# Patient Record
Sex: FEMALE | Race: OTHER | NOT HISPANIC OR LATINO | URBAN - METROPOLITAN AREA
[De-identification: names, ages, dates, MRNs, and addresses within clinical notes are randomized per-mention and may not be internally consistent; named-entity substitution may affect disease eponyms.]

---

## 2018-10-14 ENCOUNTER — INPATIENT (INPATIENT)
Facility: HOSPITAL | Age: 33
LOS: 2 days | Discharge: HOME | End: 2018-10-17
Attending: OBSTETRICS & GYNECOLOGY | Admitting: OBSTETRICS & GYNECOLOGY
Payer: COMMERCIAL

## 2018-10-14 VITALS — WEIGHT: 171.08 LBS | TEMPERATURE: 98 F | HEIGHT: 64 IN

## 2018-10-14 DIAGNOSIS — Z3A.00 WEEKS OF GESTATION OF PREGNANCY NOT SPECIFIED: ICD-10-CM

## 2018-10-14 DIAGNOSIS — O26.899 OTHER SPECIFIED PREGNANCY RELATED CONDITIONS, UNSPECIFIED TRIMESTER: ICD-10-CM

## 2018-10-14 LAB
BASOPHILS NFR BLD AUTO: 0.1 % — SIGNIFICANT CHANGE UP (ref 0–2)
BLD GP AB SCN SERPL QL: NEGATIVE — SIGNIFICANT CHANGE UP
EOSINOPHIL NFR BLD AUTO: 0.3 % — SIGNIFICANT CHANGE UP (ref 0–6)
HCT VFR BLD CALC: 33.9 % — LOW (ref 34.5–45)
HGB BLD-MCNC: 11.2 G/DL — LOW (ref 11.5–15.5)
LYMPHOCYTES # BLD AUTO: 9.9 % — LOW (ref 13–44)
MCHC RBC-ENTMCNC: 29.6 PG — SIGNIFICANT CHANGE UP (ref 27–34)
MCHC RBC-ENTMCNC: 33 G/DL — SIGNIFICANT CHANGE UP (ref 32–36)
MCV RBC AUTO: 89.4 FL — SIGNIFICANT CHANGE UP (ref 80–100)
MONOCYTES NFR BLD AUTO: 9.3 % — SIGNIFICANT CHANGE UP (ref 2–14)
NEUTROPHILS NFR BLD AUTO: 80.4 % — HIGH (ref 43–77)
PLATELET # BLD AUTO: 271 K/UL — SIGNIFICANT CHANGE UP (ref 150–400)
RBC # BLD: 3.79 M/UL — LOW (ref 3.8–5.2)
RBC # FLD: 13.1 % — SIGNIFICANT CHANGE UP (ref 10.3–16.9)
RH IG SCN BLD-IMP: POSITIVE — SIGNIFICANT CHANGE UP
RH IG SCN BLD-IMP: POSITIVE — SIGNIFICANT CHANGE UP
WBC # BLD: 17.7 K/UL — HIGH (ref 3.8–10.5)
WBC # FLD AUTO: 17.7 K/UL — HIGH (ref 3.8–10.5)

## 2018-10-14 RX ORDER — FENTANYL/BUPIVACAINE/NS/PF 2MCG/ML-.1
250 PLASTIC BAG, INJECTION (ML) INJECTION
Qty: 0 | Refills: 0 | Status: DISCONTINUED | OUTPATIENT
Start: 2018-10-14 | End: 2018-10-15

## 2018-10-14 RX ORDER — OXYTOCIN 10 UNIT/ML
333.33 VIAL (ML) INJECTION
Qty: 20 | Refills: 0 | Status: DISCONTINUED | OUTPATIENT
Start: 2018-10-14 | End: 2018-10-15

## 2018-10-14 RX ORDER — SODIUM CHLORIDE 9 MG/ML
1000 INJECTION, SOLUTION INTRAVENOUS ONCE
Qty: 0 | Refills: 0 | Status: DISCONTINUED | OUTPATIENT
Start: 2018-10-14 | End: 2018-10-15

## 2018-10-14 RX ORDER — CITRIC ACID/SODIUM CITRATE 300-500 MG
15 SOLUTION, ORAL ORAL ONCE
Qty: 0 | Refills: 0 | Status: DISCONTINUED | OUTPATIENT
Start: 2018-10-14 | End: 2018-10-15

## 2018-10-14 RX ORDER — SODIUM CHLORIDE 9 MG/ML
1000 INJECTION, SOLUTION INTRAVENOUS
Qty: 0 | Refills: 0 | Status: DISCONTINUED | OUTPATIENT
Start: 2018-10-14 | End: 2018-10-15

## 2018-10-15 LAB — T PALLIDUM AB TITR SER: NEGATIVE — SIGNIFICANT CHANGE UP

## 2018-10-15 RX ORDER — OXYTOCIN 10 UNIT/ML
41.67 VIAL (ML) INJECTION
Qty: 20 | Refills: 0 | Status: DISCONTINUED | OUTPATIENT
Start: 2018-10-15 | End: 2018-10-17

## 2018-10-15 RX ORDER — DOCUSATE SODIUM 100 MG
100 CAPSULE ORAL
Qty: 0 | Refills: 0 | Status: DISCONTINUED | OUTPATIENT
Start: 2018-10-15 | End: 2018-10-17

## 2018-10-15 RX ORDER — MAGNESIUM HYDROXIDE 400 MG/1
30 TABLET, CHEWABLE ORAL
Qty: 0 | Refills: 0 | Status: DISCONTINUED | OUTPATIENT
Start: 2018-10-15 | End: 2018-10-17

## 2018-10-15 RX ORDER — AMPICILLIN TRIHYDRATE 250 MG
2 CAPSULE ORAL EVERY 6 HOURS
Qty: 0 | Refills: 0 | Status: DISCONTINUED | OUTPATIENT
Start: 2018-10-15 | End: 2018-10-16

## 2018-10-15 RX ORDER — GENTAMICIN SULFATE 40 MG/ML
390 VIAL (ML) INJECTION ONCE
Qty: 0 | Refills: 0 | Status: COMPLETED | OUTPATIENT
Start: 2018-10-15 | End: 2018-10-15

## 2018-10-15 RX ORDER — PRAMOXINE HYDROCHLORIDE 150 MG/15G
1 AEROSOL, FOAM RECTAL EVERY 4 HOURS
Qty: 0 | Refills: 0 | Status: DISCONTINUED | OUTPATIENT
Start: 2018-10-15 | End: 2018-10-17

## 2018-10-15 RX ORDER — CITRIC ACID/SODIUM CITRATE 300-500 MG
30 SOLUTION, ORAL ORAL ONCE
Qty: 0 | Refills: 0 | Status: DISCONTINUED | OUTPATIENT
Start: 2018-10-15 | End: 2018-10-15

## 2018-10-15 RX ORDER — ACETAMINOPHEN 500 MG
650 TABLET ORAL EVERY 6 HOURS
Qty: 0 | Refills: 0 | Status: DISCONTINUED | OUTPATIENT
Start: 2018-10-15 | End: 2018-10-17

## 2018-10-15 RX ORDER — GLYCERIN ADULT
1 SUPPOSITORY, RECTAL RECTAL AT BEDTIME
Qty: 0 | Refills: 0 | Status: DISCONTINUED | OUTPATIENT
Start: 2018-10-15 | End: 2018-10-17

## 2018-10-15 RX ORDER — ACETAMINOPHEN 500 MG
1000 TABLET ORAL ONCE
Qty: 0 | Refills: 0 | Status: COMPLETED | OUTPATIENT
Start: 2018-10-15 | End: 2018-10-15

## 2018-10-15 RX ORDER — OXYCODONE AND ACETAMINOPHEN 5; 325 MG/1; MG/1
2 TABLET ORAL EVERY 6 HOURS
Qty: 0 | Refills: 0 | Status: DISCONTINUED | OUTPATIENT
Start: 2018-10-15 | End: 2018-10-17

## 2018-10-15 RX ORDER — SIMETHICONE 80 MG/1
80 TABLET, CHEWABLE ORAL EVERY 6 HOURS
Qty: 0 | Refills: 0 | Status: DISCONTINUED | OUTPATIENT
Start: 2018-10-15 | End: 2018-10-17

## 2018-10-15 RX ORDER — TETANUS TOXOID, REDUCED DIPHTHERIA TOXOID AND ACELLULAR PERTUSSIS VACCINE, ADSORBED 5; 2.5; 8; 8; 2.5 [IU]/.5ML; [IU]/.5ML; UG/.5ML; UG/.5ML; UG/.5ML
0.5 SUSPENSION INTRAMUSCULAR ONCE
Qty: 0 | Refills: 0 | Status: DISCONTINUED | OUTPATIENT
Start: 2018-10-15 | End: 2018-10-17

## 2018-10-15 RX ORDER — SODIUM CHLORIDE 9 MG/ML
3 INJECTION INTRAMUSCULAR; INTRAVENOUS; SUBCUTANEOUS EVERY 8 HOURS
Qty: 0 | Refills: 0 | Status: DISCONTINUED | OUTPATIENT
Start: 2018-10-15 | End: 2018-10-17

## 2018-10-15 RX ORDER — AMPICILLIN TRIHYDRATE 250 MG
2 CAPSULE ORAL ONCE
Qty: 0 | Refills: 0 | Status: COMPLETED | OUTPATIENT
Start: 2018-10-15 | End: 2018-10-15

## 2018-10-15 RX ORDER — AER TRAVELER 0.5 G/1
1 SOLUTION RECTAL; TOPICAL EVERY 4 HOURS
Qty: 0 | Refills: 0 | Status: DISCONTINUED | OUTPATIENT
Start: 2018-10-15 | End: 2018-10-17

## 2018-10-15 RX ORDER — AMPICILLIN TRIHYDRATE 250 MG
CAPSULE ORAL
Qty: 0 | Refills: 0 | Status: DISCONTINUED | OUTPATIENT
Start: 2018-10-15 | End: 2018-10-16

## 2018-10-15 RX ORDER — CEFAZOLIN SODIUM 1 G
2000 VIAL (EA) INJECTION ONCE
Qty: 0 | Refills: 0 | Status: DISCONTINUED | OUTPATIENT
Start: 2018-10-15 | End: 2018-10-15

## 2018-10-15 RX ORDER — HYDROCORTISONE 1 %
1 OINTMENT (GRAM) TOPICAL EVERY 4 HOURS
Qty: 0 | Refills: 0 | Status: DISCONTINUED | OUTPATIENT
Start: 2018-10-15 | End: 2018-10-17

## 2018-10-15 RX ORDER — AZITHROMYCIN 500 MG/1
500 TABLET, FILM COATED ORAL ONCE
Qty: 0 | Refills: 0 | Status: DISCONTINUED | OUTPATIENT
Start: 2018-10-15 | End: 2018-10-15

## 2018-10-15 RX ORDER — DIPHENHYDRAMINE HCL 50 MG
25 CAPSULE ORAL EVERY 6 HOURS
Qty: 0 | Refills: 0 | Status: DISCONTINUED | OUTPATIENT
Start: 2018-10-15 | End: 2018-10-17

## 2018-10-15 RX ORDER — OXYCODONE AND ACETAMINOPHEN 5; 325 MG/1; MG/1
1 TABLET ORAL
Qty: 0 | Refills: 0 | Status: DISCONTINUED | OUTPATIENT
Start: 2018-10-15 | End: 2018-10-17

## 2018-10-15 RX ORDER — IBUPROFEN 200 MG
600 TABLET ORAL EVERY 6 HOURS
Qty: 0 | Refills: 0 | Status: DISCONTINUED | OUTPATIENT
Start: 2018-10-15 | End: 2018-10-17

## 2018-10-15 RX ORDER — DIBUCAINE 1 %
1 OINTMENT (GRAM) RECTAL EVERY 4 HOURS
Qty: 0 | Refills: 0 | Status: DISCONTINUED | OUTPATIENT
Start: 2018-10-15 | End: 2018-10-17

## 2018-10-15 RX ORDER — LANOLIN
1 OINTMENT (GRAM) TOPICAL EVERY 6 HOURS
Qty: 0 | Refills: 0 | Status: DISCONTINUED | OUTPATIENT
Start: 2018-10-15 | End: 2018-10-17

## 2018-10-15 RX ADMIN — Medication 216 GRAM(S): at 19:53

## 2018-10-15 RX ADMIN — Medication 1 CAPSULE(S): at 13:00

## 2018-10-15 RX ADMIN — OXYCODONE AND ACETAMINOPHEN 1 TABLET(S): 5; 325 TABLET ORAL at 05:00

## 2018-10-15 RX ADMIN — Medication 1 APPLICATION(S): at 12:01

## 2018-10-15 RX ADMIN — SODIUM CHLORIDE 3 MILLILITER(S): 9 INJECTION INTRAMUSCULAR; INTRAVENOUS; SUBCUTANEOUS at 14:01

## 2018-10-15 RX ADMIN — Medication 600 MILLIGRAM(S): at 06:35

## 2018-10-15 RX ADMIN — Medication 100 MILLIGRAM(S): at 19:10

## 2018-10-15 RX ADMIN — OXYCODONE AND ACETAMINOPHEN 1 TABLET(S): 5; 325 TABLET ORAL at 04:40

## 2018-10-15 RX ADMIN — Medication 600 MILLIGRAM(S): at 07:05

## 2018-10-15 RX ADMIN — Medication 216 GRAM(S): at 14:01

## 2018-10-15 RX ADMIN — SODIUM CHLORIDE 3 MILLILITER(S): 9 INJECTION INTRAMUSCULAR; INTRAVENOUS; SUBCUTANEOUS at 06:35

## 2018-10-15 RX ADMIN — Medication 100 MILLIGRAM(S): at 12:02

## 2018-10-15 RX ADMIN — Medication 100 MILLIGRAM(S): at 04:05

## 2018-10-15 RX ADMIN — Medication 1 CAPSULE(S): at 12:01

## 2018-10-15 RX ADMIN — Medication 200 MILLIGRAM(S): at 02:42

## 2018-10-15 RX ADMIN — Medication 600 MILLIGRAM(S): at 12:01

## 2018-10-15 RX ADMIN — Medication 100 MILLIGRAM(S): at 12:01

## 2018-10-15 RX ADMIN — OXYCODONE AND ACETAMINOPHEN 1 TABLET(S): 5; 325 TABLET ORAL at 19:13

## 2018-10-15 RX ADMIN — OXYCODONE AND ACETAMINOPHEN 1 TABLET(S): 5; 325 TABLET ORAL at 20:09

## 2018-10-15 RX ADMIN — Medication 600 MILLIGRAM(S): at 20:09

## 2018-10-15 RX ADMIN — Medication 1 TABLET(S): at 12:01

## 2018-10-15 RX ADMIN — Medication 125 MILLIUNIT(S)/MIN: at 02:51

## 2018-10-15 RX ADMIN — Medication 600 MILLIGRAM(S): at 19:04

## 2018-10-15 RX ADMIN — Medication 400 MILLIGRAM(S): at 02:23

## 2018-10-15 RX ADMIN — Medication 600 MILLIGRAM(S): at 13:00

## 2018-10-15 RX ADMIN — Medication 216 GRAM(S): at 02:02

## 2018-10-15 RX ADMIN — Medication 216 GRAM(S): at 08:12

## 2018-10-16 RX ORDER — INFLUENZA VIRUS VACCINE 15; 15; 15; 15 UG/.5ML; UG/.5ML; UG/.5ML; UG/.5ML
0.5 SUSPENSION INTRAMUSCULAR ONCE
Qty: 0 | Refills: 0 | Status: COMPLETED | OUTPATIENT
Start: 2018-10-16 | End: 2018-10-16

## 2018-10-16 RX ADMIN — AER TRAVELER 1 APPLICATION(S): 0.5 SOLUTION RECTAL; TOPICAL at 18:05

## 2018-10-16 RX ADMIN — Medication 1 CAPSULE(S): at 18:47

## 2018-10-16 RX ADMIN — AER TRAVELER 1 APPLICATION(S): 0.5 SOLUTION RECTAL; TOPICAL at 10:29

## 2018-10-16 RX ADMIN — Medication 600 MILLIGRAM(S): at 13:27

## 2018-10-16 RX ADMIN — Medication 600 MILLIGRAM(S): at 01:15

## 2018-10-16 RX ADMIN — Medication 600 MILLIGRAM(S): at 08:00

## 2018-10-16 RX ADMIN — Medication 600 MILLIGRAM(S): at 12:27

## 2018-10-16 RX ADMIN — Medication 600 MILLIGRAM(S): at 23:44

## 2018-10-16 RX ADMIN — Medication 600 MILLIGRAM(S): at 18:04

## 2018-10-16 RX ADMIN — Medication 600 MILLIGRAM(S): at 00:45

## 2018-10-16 RX ADMIN — OXYCODONE AND ACETAMINOPHEN 1 TABLET(S): 5; 325 TABLET ORAL at 06:04

## 2018-10-16 RX ADMIN — SODIUM CHLORIDE 3 MILLILITER(S): 9 INJECTION INTRAMUSCULAR; INTRAVENOUS; SUBCUTANEOUS at 13:28

## 2018-10-16 RX ADMIN — Medication 600 MILLIGRAM(S): at 19:00

## 2018-10-16 RX ADMIN — INFLUENZA VIRUS VACCINE 0.5 MILLILITER(S): 15; 15; 15; 15 SUSPENSION INTRAMUSCULAR at 12:43

## 2018-10-16 RX ADMIN — Medication 1 CAPSULE(S): at 19:00

## 2018-10-16 RX ADMIN — OXYCODONE AND ACETAMINOPHEN 1 TABLET(S): 5; 325 TABLET ORAL at 16:50

## 2018-10-16 RX ADMIN — Medication 600 MILLIGRAM(S): at 07:10

## 2018-10-16 RX ADMIN — OXYCODONE AND ACETAMINOPHEN 1 TABLET(S): 5; 325 TABLET ORAL at 06:45

## 2018-10-16 RX ADMIN — AER TRAVELER 1 APPLICATION(S): 0.5 SOLUTION RECTAL; TOPICAL at 13:29

## 2018-10-16 RX ADMIN — OXYCODONE AND ACETAMINOPHEN 1 TABLET(S): 5; 325 TABLET ORAL at 16:04

## 2018-10-16 RX ADMIN — Medication 1 TABLET(S): at 12:27

## 2018-10-16 NOTE — PROGRESS NOTE ADULT - SUBJECTIVE AND OBJECTIVE BOX
Patient evaluated at bedside.     She reports pain is well controlled with percocet and motrin. She is taking fioricet for spinal HA. Reports medications helps HA, reprots HA is improved currently.     She has been ambulating without assistance, voiding spontaneously, and is breastfeeding.    She denies chills, dizziness, chest pain, palpitations, shortness of breath, n/v, heavy vaginal bleeding or perineal discomfort.    Physical Exam:  Vital Signs Last 24 Hrs  T(C): 36.5 (16 Oct 2018 06:00), Max: 36.7 (15 Oct 2018 10:11)  T(F): 97.7 (16 Oct 2018 06:00), Max: 98.1 (15 Oct 2018 14:00)  HR: 84 (16 Oct 2018 06:00) (72 - 87)  BP: 99/66 (16 Oct 2018 06:00) (94/61 - 102/64)  BP(mean): --  RR: 18 (16 Oct 2018 06:00) (17 - 18)  SpO2: 97% (16 Oct 2018 06:00) (97% - 99%)    GA: NAD, A+0 x 3  Abd: + BS, soft, nontender, nondistended, no rebound or guarding, uterus firm at midline  : lochia WNL  Extremities: no edema or calf tenderness                          11.2   17.7  )-----------( 271      ( 14 Oct 2018 16:59 )             33.9

## 2018-10-16 NOTE — PROGRESS NOTE ADULT - ASSESSMENT
A/P yo 33y  s/p , PP#1 , c/b suspected triple I, now stable  1. Suspected triple I  -s/p A/G  -currently resolved  2. Spinal HA  -HA improved this morning on fioricet  -possible blood patch if HA returns  3. Pain: OPM  4. GI: Reg  5. :  Voiding  6. DVT prophylaxis: SCDs, ambulation  7. Dispo: PP#2

## 2018-10-16 NOTE — LACTATION INITIAL EVALUATION - NS LACT CON REASON FOR REQ
Baby ~30 hours, asleep in WBN at this time. Mother instructed to supplement overnight due to 8% weight loss in <24 hours. Mother reports baby is latching well, voiding and stooling. Reinforced triple feeding plan of care until weight loss stablizes or as per peds. Plan was explained in detail; pt is to offer the breast on demand and supplement with EBM. Breast pump at bedside. Encouraged to call for assistance as needed./general questions without assessment/multiparous mom

## 2018-10-16 NOTE — PROGRESS NOTE ADULT - SUBJECTIVE AND OBJECTIVE BOX
Pt is day 2 s/p  w PDPH after wet tap during epidural placement on 10/14/18. Pt with complaints of positional headache at this time worse with working. Pt offered epidural blood patch but is refusing at this time. She would like to proceed with conservative therapy at this time. Will continue to follow.

## 2018-10-17 VITALS
RESPIRATION RATE: 18 BRPM | DIASTOLIC BLOOD PRESSURE: 65 MMHG | HEART RATE: 82 BPM | OXYGEN SATURATION: 98 % | SYSTOLIC BLOOD PRESSURE: 108 MMHG | TEMPERATURE: 98 F

## 2018-10-17 LAB — SURGICAL PATHOLOGY STUDY: SIGNIFICANT CHANGE UP

## 2018-10-17 PROCEDURE — 85025 COMPLETE CBC W/AUTO DIFF WBC: CPT

## 2018-10-17 PROCEDURE — 90686 IIV4 VACC NO PRSV 0.5 ML IM: CPT

## 2018-10-17 PROCEDURE — 99214 OFFICE O/P EST MOD 30 MIN: CPT

## 2018-10-17 PROCEDURE — 86780 TREPONEMA PALLIDUM: CPT

## 2018-10-17 PROCEDURE — 86901 BLOOD TYPING SEROLOGIC RH(D): CPT

## 2018-10-17 PROCEDURE — 86900 BLOOD TYPING SEROLOGIC ABO: CPT

## 2018-10-17 PROCEDURE — 86850 RBC ANTIBODY SCREEN: CPT

## 2018-10-17 PROCEDURE — 88307 TISSUE EXAM BY PATHOLOGIST: CPT

## 2018-10-17 RX ADMIN — Medication 1 CAPSULE(S): at 16:00

## 2018-10-17 RX ADMIN — Medication 1 CAPSULE(S): at 09:30

## 2018-10-17 RX ADMIN — Medication 600 MILLIGRAM(S): at 00:44

## 2018-10-17 RX ADMIN — Medication 600 MILLIGRAM(S): at 07:57

## 2018-10-17 RX ADMIN — Medication 1 CAPSULE(S): at 15:13

## 2018-10-17 RX ADMIN — SODIUM CHLORIDE 3 MILLILITER(S): 9 INJECTION INTRAMUSCULAR; INTRAVENOUS; SUBCUTANEOUS at 12:31

## 2018-10-17 RX ADMIN — Medication 100 MILLIGRAM(S): at 12:32

## 2018-10-17 RX ADMIN — Medication 1 TABLET(S): at 12:31

## 2018-10-17 RX ADMIN — Medication 600 MILLIGRAM(S): at 08:48

## 2018-10-17 RX ADMIN — Medication 1 CAPSULE(S): at 08:45

## 2018-10-17 NOTE — PROGRESS NOTE ADULT - SUBJECTIVE AND OBJECTIVE BOX
Patient evaluated at bedside.   She reports pain is well controlled.    She has been ambulating without assistance, voiding spontaneously, and is breastfeeding.    She denies HA, dizziness, chest pain, palpitations, shortness of breathe, n/v, heavy vaginal bleeding or perineal discomfort.    Physical Exam:  Vital Signs Last 24 Hrs  T(C): 36.8 (17 Oct 2018 06:00), Max: 36.8 (17 Oct 2018 06:00)  T(F): 98.3 (17 Oct 2018 06:00), Max: 98.3 (17 Oct 2018 06:00)  HR: 87 (17 Oct 2018 06:00) (78 - 87)  BP: 95/62 (17 Oct 2018 06:00) (95/62 - 114/75)  RR: 18 (17 Oct 2018 06:00) (18 - 18)  SpO2: 97% (17 Oct 2018 06:00) (97% - 99%)    GA: NAD, A+0 x 3  Abd: + BS, soft, nontender, nondistended, no rebound or guarding, uterus firm at midline  : lochia WNL  Extremities: no swelling or calf tenderness        MEDICATIONS  (STANDING):  diphtheria/tetanus/pertussis (acellular) Vaccine (ADAcel) 0.5 milliLiter(s) IntraMuscular once  ibuprofen  Tablet. 600 milliGRAM(s) Oral every 6 hours  oxytocin Infusion 41.667 milliUNIT(s)/Min (125 mL/Hr) IV Continuous <Continuous>  prenatal multivitamin 1 Tablet(s) Oral daily  sodium chloride 0.9% lock flush 3 milliLiter(s) IV Push every 8 hours  witch hazel Pads 1 Application(s) Topical every 4 hours    MEDICATIONS  (PRN):  acetaminophen   Tablet .. 650 milliGRAM(s) Oral every 6 hours PRN Temp greater or equal to 38.5C (101.3F), Mild Pain (1 - 3)  acetaminophen 300 mG/butalbital 50 mG/ caffeine 40 mG 1 Capsule(s) Oral every 6 hours PRN headache  dibucaine 1% Ointment 1 Application(s) Topical every 4 hours PRN Moderate Perineal Discomfort  diphenhydrAMINE 25 milliGRAM(s) Oral every 6 hours PRN Itching  docusate sodium 100 milliGRAM(s) Oral two times a day PRN Stool Softening  glycerin Suppository - Adult 1 Suppository(s) Rectal at bedtime PRN Constipation  hydrocortisone 1% Cream 1 Application(s) Topical every 4 hours PRN Severe Perineal Pain  lanolin Ointment 1 Application(s) Topical every 6 hours PRN Sore Nipples  magnesium hydroxide Suspension 30 milliLiter(s) Oral two times a day PRN Constipation  oxyCODONE    5 mG/acetaminophen 325 mG 1 Tablet(s) Oral every 3 hours PRN Moderate Pain (4 - 6)  oxyCODONE    5 mG/acetaminophen 325 mG 2 Tablet(s) Oral every 6 hours PRN Severe Pain (7 - 10)  pramoxine 1%/zinc 5% Cream 1 Application(s) Topical every 4 hours PRN Mild Perineal Pain  simethicone 80 milliGRAM(s) Chew every 6 hours PRN Gas

## 2018-10-17 NOTE — DISCHARGE NOTE OB - PATIENT PORTAL LINK FT
You can access the [facility] Patient Portal, offered by [facility], by registering with the following website: [email or web address]

## 2018-10-17 NOTE — DISCHARGE NOTE OB - CARE PLAN
Principal Discharge DX:	Postpartum state  Goal:	postpartum care  Assessment and plan of treatment:	33y female s/p vaginal delivery, postpartum day 2, meeting all postpartum milestones. Pelvic rest until cleared. Follow-up with obstetrician in 6 weeks.  Secondary Diagnosis:	Spinal headache complicating labor and delivery, postpartum condition  Goal:	resolution of symptoms  Assessment and plan of treatment:	continue fioricet as needed for severe headache. Do not take more than 2 times daily AND/OR for more than 3 days for tendency to cause rebound headache.

## 2018-10-17 NOTE — DISCHARGE NOTE OB - MEDICATION SUMMARY - MEDICATIONS TO TAKE
I will START or STAY ON the medications listed below when I get home from the hospital:    butalbital/acetaminophen/caffeine 50 mg-300 mg-40 mg oral capsule  -- 1 cap(s) by mouth every 6 hours, As needed, headache  -- Indication: For as needed for severe headache    Prena1 oral capsule  -- 1 cap(s) by mouth once a day  -- Indication: For Postpartum state

## 2018-10-17 NOTE — PROGRESS NOTE ADULT - ASSESSMENT
A/P yo 33y  s/p , PP#2 , c/b suspected triple I, now stable  1. Suspected triple I  -s/p A/G  -currently resolved  2. Spinal HA  -HA improved this morning on Fioricet (no complaints of HA this morning.  (possible blood patch if HA returns).    1. Pain: OPM  2. GI: Reg  3. :  Voiding  4. DVT prophylaxis: SCDs, ambulation  5. Dispo: PP#2

## 2018-10-17 NOTE — DISCHARGE NOTE OB - CARE PROVIDER_API CALL
Malka Rodas), Obstetrics and Gynecology  [facility] [place] [address]  Phone: [phone]  Fax: [phone]

## 2018-10-17 NOTE — DISCHARGE NOTE OB - HOSPITAL COURSE
s/p  . course c/b spinal headache - headache resolved with fioricet. cleared by anesthesia for discharge.

## 2018-10-17 NOTE — DISCHARGE NOTE OB - PLAN OF CARE
postpartum care 33y female s/p vaginal delivery, postpartum day 2, meeting all postpartum milestones. Pelvic rest until cleared. Follow-up with obstetrician in 6 weeks. resolution of symptoms continue fioricet as needed for severe headache. Do not take more than 2 times daily AND/OR for more than 3 days for tendency to cause rebound headache.

## 2018-10-19 ENCOUNTER — EMERGENCY (EMERGENCY)
Facility: HOSPITAL | Age: 33
LOS: 1 days | Discharge: HOME | End: 2018-10-19
Attending: EMERGENCY MEDICINE | Admitting: EMERGENCY MEDICINE
Payer: COMMERCIAL

## 2018-10-19 VITALS
OXYGEN SATURATION: 98 % | SYSTOLIC BLOOD PRESSURE: 119 MMHG | TEMPERATURE: 98 F | RESPIRATION RATE: 20 BRPM | HEART RATE: 71 BPM | DIASTOLIC BLOOD PRESSURE: 72 MMHG

## 2018-10-19 DIAGNOSIS — O99.89 OTHER SPECIFIED DISEASES AND CONDITIONS COMPLICATING PREGNANCY, CHILDBIRTH AND THE PUERPERIUM: ICD-10-CM

## 2018-10-19 DIAGNOSIS — Z79.899 OTHER LONG TERM (CURRENT) DRUG THERAPY: ICD-10-CM

## 2018-10-19 DIAGNOSIS — R51 HEADACHE: ICD-10-CM

## 2018-10-19 PROCEDURE — 99284 EMERGENCY DEPT VISIT MOD MDM: CPT

## 2018-10-19 RX ORDER — KETOROLAC TROMETHAMINE 30 MG/ML
30 SYRINGE (ML) INJECTION ONCE
Qty: 0 | Refills: 0 | Status: DISCONTINUED | OUTPATIENT
Start: 2018-10-19 | End: 2018-10-19

## 2018-10-19 RX ORDER — METOCLOPRAMIDE HCL 10 MG
10 TABLET ORAL ONCE
Qty: 0 | Refills: 0 | Status: COMPLETED | OUTPATIENT
Start: 2018-10-19 | End: 2018-10-19

## 2018-10-19 RX ORDER — HYDROMORPHONE HYDROCHLORIDE 2 MG/ML
0.5 INJECTION INTRAMUSCULAR; INTRAVENOUS; SUBCUTANEOUS ONCE
Qty: 0 | Refills: 0 | Status: DISCONTINUED | OUTPATIENT
Start: 2018-10-19 | End: 2018-10-19

## 2018-10-19 RX ORDER — SODIUM CHLORIDE 9 MG/ML
500 INJECTION INTRAMUSCULAR; INTRAVENOUS; SUBCUTANEOUS ONCE
Qty: 0 | Refills: 0 | Status: COMPLETED | OUTPATIENT
Start: 2018-10-19 | End: 2018-10-19

## 2018-10-19 RX ADMIN — HYDROMORPHONE HYDROCHLORIDE 0.5 MILLIGRAM(S): 2 INJECTION INTRAMUSCULAR; INTRAVENOUS; SUBCUTANEOUS at 22:57

## 2018-10-19 RX ADMIN — Medication 30 MILLIGRAM(S): at 22:25

## 2018-10-19 RX ADMIN — SODIUM CHLORIDE 1000 MILLILITER(S): 9 INJECTION INTRAMUSCULAR; INTRAVENOUS; SUBCUTANEOUS at 22:24

## 2018-10-19 RX ADMIN — Medication 104 MILLIGRAM(S): at 22:25

## 2018-10-19 NOTE — ED PROVIDER NOTE - PROGRESS NOTE DETAILS
blood patch done by anesthesiologist. Pt feels much better. will observed for a few hours and d/c home if conditions the same.

## 2018-10-19 NOTE — ED ADULT NURSE REASSESSMENT NOTE - NS ED NURSE REASSESS COMMENT FT1
s/p epidural patch done, pt lying flat on bed for 2 hours, A&Ox3, NAD, denies pain at the moment, RN will continue monitor the pt.

## 2018-10-19 NOTE — ED PROVIDER NOTE - MEDICAL DECISION MAKING DETAILS
34 yo female, 5 days s/p NVD, in the ER due to persistent spinal HA. Pt was recommended to have blood patch done. Afebrile, VSS. Anesthesiologist called and case discussed. pending procedure.

## 2018-10-19 NOTE — ED PROVIDER NOTE - ATTENDING CONTRIBUTION TO CARE
33 yof pw HA after spinal epidural.  no fc, no neck pain.  no paresthesia or weakness to extremities.    agree w/ PA, nad, ao x 3, clear speech, will d/w anesthesia regarding blood patch, reassess

## 2018-10-19 NOTE — ED ADULT NURSE NOTE - NSIMPLEMENTINTERV_GEN_ALL_ED
Implemented All Universal Safety Interventions:  [place] to call system. Call bell, personal items and telephone within reach. Instruct patient to call for assistance. Room bathroom lighting operational. Non-slip footwear when patient is off stretcher. Physically safe environment: no spills, clutter or unnecessary equipment. Stretcher in lowest position, wheels locked, appropriate side rails in place.

## 2018-10-19 NOTE — ED ADULT TRIAGE NOTE - ARRIVAL INFO ADDITIONAL COMMENTS
Pt walked into ED with c/o of lower back pain that radiates to the head with a headache and nausea. Onset was tuesday. She states she had vaginal delivery on momday. She states there was an epidural leakage and was offered blood patch but refused. She denies V+D, fever, chills, visual changes, numbness, tingling, unilateral weakness, CP, increased vaginal bleeding. She was given fiorocet at the hospital which did help. She last took ibuprofen with no help. Denies HTN, or medical problems.

## 2018-10-19 NOTE — ED ADULT NURSE NOTE - OBJECTIVE STATEMENT
s/p vaginal delivery Monday of this week had epidural on the delivery, as per pt had s/p vaginal delivery Monday of this week had epidural on the delivery, as per pt had epidural leakage and advised to have blood patch and she refused, since Tuesday of this week been having headache from back to head more severe today, last motrin @ 7pm today.

## 2018-10-19 NOTE — ED PROVIDER NOTE - OBJECTIVE STATEMENT
32 yo female s/p NVD on 10/15, in the Er due to persistent HA. Pt reports she had HA right after her delivery, was discharged home with Fioricet and Motrin for it , but has no relief. Pt was  told that she needs a blood patch in order to treat her spinal HA.   Denies fever, chills. HA associated with dizziness, nausea.

## 2018-10-20 VITALS
HEART RATE: 60 BPM | SYSTOLIC BLOOD PRESSURE: 109 MMHG | RESPIRATION RATE: 18 BRPM | DIASTOLIC BLOOD PRESSURE: 62 MMHG | OXYGEN SATURATION: 98 % | TEMPERATURE: 98 F

## 2018-10-20 LAB
ANION GAP SERPL CALC-SCNC: 17 MMOL/L — SIGNIFICANT CHANGE UP (ref 5–17)
BASOPHILS NFR BLD AUTO: 0.2 % — SIGNIFICANT CHANGE UP (ref 0–2)
BUN SERPL-MCNC: 10 MG/DL — SIGNIFICANT CHANGE UP (ref 7–23)
CALCIUM SERPL-MCNC: 9.1 MG/DL — SIGNIFICANT CHANGE UP (ref 8.4–10.5)
CHLORIDE SERPL-SCNC: 101 MMOL/L — SIGNIFICANT CHANGE UP (ref 96–108)
CO2 SERPL-SCNC: 22 MMOL/L — SIGNIFICANT CHANGE UP (ref 22–31)
CREAT SERPL-MCNC: 0.7 MG/DL — SIGNIFICANT CHANGE UP (ref 0.5–1.3)
EOSINOPHIL NFR BLD AUTO: 2.1 % — SIGNIFICANT CHANGE UP (ref 0–6)
GLUCOSE SERPL-MCNC: 81 MG/DL — SIGNIFICANT CHANGE UP (ref 70–99)
HCT VFR BLD CALC: 28.3 % — LOW (ref 34.5–45)
HGB BLD-MCNC: 9 G/DL — LOW (ref 11.5–15.5)
LYMPHOCYTES # BLD AUTO: 17.4 % — SIGNIFICANT CHANGE UP (ref 13–44)
MCHC RBC-ENTMCNC: 28.8 PG — SIGNIFICANT CHANGE UP (ref 27–34)
MCHC RBC-ENTMCNC: 31.8 G/DL — LOW (ref 32–36)
MCV RBC AUTO: 90.7 FL — SIGNIFICANT CHANGE UP (ref 80–100)
MONOCYTES NFR BLD AUTO: 7.4 % — SIGNIFICANT CHANGE UP (ref 2–14)
NEUTROPHILS NFR BLD AUTO: 72.9 % — SIGNIFICANT CHANGE UP (ref 43–77)
PLATELET # BLD AUTO: 314 K/UL — SIGNIFICANT CHANGE UP (ref 150–400)
POTASSIUM SERPL-MCNC: 3.9 MMOL/L — SIGNIFICANT CHANGE UP (ref 3.5–5.3)
POTASSIUM SERPL-SCNC: 3.9 MMOL/L — SIGNIFICANT CHANGE UP (ref 3.5–5.3)
RBC # BLD: 3.12 M/UL — LOW (ref 3.8–5.2)
RBC # FLD: 13.4 % — SIGNIFICANT CHANGE UP (ref 10.3–16.9)
SODIUM SERPL-SCNC: 140 MMOL/L — SIGNIFICANT CHANGE UP (ref 135–145)
WBC # BLD: 8.6 K/UL — SIGNIFICANT CHANGE UP (ref 3.8–10.5)
WBC # FLD AUTO: 8.6 K/UL — SIGNIFICANT CHANGE UP (ref 3.8–10.5)

## 2018-10-20 PROCEDURE — 80048 BASIC METABOLIC PNL TOTAL CA: CPT

## 2018-10-20 PROCEDURE — 85025 COMPLETE CBC W/AUTO DIFF WBC: CPT

## 2018-10-20 PROCEDURE — 36415 COLL VENOUS BLD VENIPUNCTURE: CPT

## 2018-10-20 PROCEDURE — 96375 TX/PRO/DX INJ NEW DRUG ADDON: CPT

## 2018-10-20 PROCEDURE — 96374 THER/PROPH/DIAG INJ IV PUSH: CPT

## 2018-10-20 PROCEDURE — 99284 EMERGENCY DEPT VISIT MOD MDM: CPT | Mod: 25

## 2018-10-22 DIAGNOSIS — Z3A.38 38 WEEKS GESTATION OF PREGNANCY: ICD-10-CM

## 2018-10-22 DIAGNOSIS — Z34.83 ENCOUNTER FOR SUPERVISION OF OTHER NORMAL PREGNANCY, THIRD TRIMESTER: ICD-10-CM

## 2018-10-22 DIAGNOSIS — G97.1 OTHER REACTION TO SPINAL AND LUMBAR PUNCTURE: ICD-10-CM

## 2018-10-22 DIAGNOSIS — O41.1230 CHORIOAMNIONITIS, THIRD TRIMESTER, NOT APPLICABLE OR UNSPECIFIED: ICD-10-CM
